# Patient Record
Sex: MALE | Race: WHITE | NOT HISPANIC OR LATINO | URBAN - METROPOLITAN AREA
[De-identification: names, ages, dates, MRNs, and addresses within clinical notes are randomized per-mention and may not be internally consistent; named-entity substitution may affect disease eponyms.]

---

## 2017-09-18 ENCOUNTER — INPATIENT (INPATIENT)
Facility: HOSPITAL | Age: 27
LOS: 2 days | Discharge: HOME | DRG: 869 | End: 2017-09-21
Attending: FAMILY MEDICINE | Admitting: HOSPITALIST
Payer: COMMERCIAL

## 2017-09-18 VITALS — WEIGHT: 210.1 LBS | HEIGHT: 71 IN

## 2017-09-18 DIAGNOSIS — R16.1 SPLENOMEGALY, NOT ELSEWHERE CLASSIFIED: ICD-10-CM

## 2017-09-18 DIAGNOSIS — R50.9 FEVER, UNSPECIFIED: ICD-10-CM

## 2017-09-18 LAB
AMPHET UR-MCNC: NEGATIVE — SIGNIFICANT CHANGE UP
ANISOCYTOSIS BLD QL: SLIGHT — SIGNIFICANT CHANGE UP
APPEARANCE UR: CLEAR — SIGNIFICANT CHANGE UP
BARBITURATES UR SCN-MCNC: NEGATIVE — SIGNIFICANT CHANGE UP
BENZODIAZ UR-MCNC: NEGATIVE — SIGNIFICANT CHANGE UP
BILIRUB UR-MCNC: NEGATIVE — SIGNIFICANT CHANGE UP
CK MB CFR SERPL CALC: 1.6 NG/ML — SIGNIFICANT CHANGE UP (ref 0–6.7)
CK SERPL-CCNC: 196 U/L — SIGNIFICANT CHANGE UP (ref 30–200)
COCAINE METAB.OTHER UR-MCNC: NEGATIVE — SIGNIFICANT CHANGE UP
COLOR SPEC: YELLOW — SIGNIFICANT CHANGE UP
D DIMER BLD IA.RAPID-MCNC: 292 NG/ML DDU — HIGH
DIFF PNL FLD: NEGATIVE — SIGNIFICANT CHANGE UP
EOSINOPHIL NFR BLD AUTO: 1 % — SIGNIFICANT CHANGE UP (ref 0–6)
EPI CELLS # UR: SIGNIFICANT CHANGE UP
GLUCOSE UR QL: NEGATIVE MG/DL — SIGNIFICANT CHANGE UP
HCT VFR BLD CALC: 41.6 % — LOW (ref 42–52)
HGB BLD-MCNC: 14.6 G/DL — SIGNIFICANT CHANGE UP (ref 14–18)
HIV 1 & 2 AB SERPL IA.RAPID: SIGNIFICANT CHANGE UP
KETONES UR-MCNC: NEGATIVE — SIGNIFICANT CHANGE UP
LACTATE BLDV-MCNC: 1 MMOL/L — SIGNIFICANT CHANGE UP (ref 0.5–2)
LACTATE SERPL-SCNC: 0.8 MMOL/L — SIGNIFICANT CHANGE UP (ref 0.5–2)
LEUKOCYTE ESTERASE UR-ACNC: NEGATIVE — SIGNIFICANT CHANGE UP
LYMPHOCYTES # BLD AUTO: 4 % — LOW (ref 20–55)
MCHC RBC-ENTMCNC: 29.7 PG — SIGNIFICANT CHANGE UP (ref 27–31)
MCHC RBC-ENTMCNC: 35.1 G/DL — SIGNIFICANT CHANGE UP (ref 32–36)
MCV RBC AUTO: 84.6 FL — SIGNIFICANT CHANGE UP (ref 80–94)
METHADONE UR-MCNC: NEGATIVE — SIGNIFICANT CHANGE UP
MICROCYTES BLD QL: SLIGHT — SIGNIFICANT CHANGE UP
MONOCYTES NFR BLD AUTO: 5 % — SIGNIFICANT CHANGE UP (ref 3–10)
NEUTROPHILS NFR BLD AUTO: 88 % — HIGH (ref 37–73)
NEUTS BAND # BLD: 2 % — SIGNIFICANT CHANGE UP (ref 0–8)
NITRITE UR-MCNC: NEGATIVE — SIGNIFICANT CHANGE UP
OPIATES UR-MCNC: NEGATIVE — SIGNIFICANT CHANGE UP
PCP SPEC-MCNC: SIGNIFICANT CHANGE UP
PCP UR-MCNC: NEGATIVE — SIGNIFICANT CHANGE UP
PH UR: 6.5 — SIGNIFICANT CHANGE UP (ref 5–8)
PLAT MORPH BLD: NORMAL — SIGNIFICANT CHANGE UP
PLATELET # BLD AUTO: 154 K/UL — SIGNIFICANT CHANGE UP (ref 150–400)
PROT UR-MCNC: NEGATIVE MG/DL — SIGNIFICANT CHANGE UP
RAPID RVP RESULT: SIGNIFICANT CHANGE UP
RBC # BLD: 4.92 M/UL — SIGNIFICANT CHANGE UP (ref 4.6–6.2)
RBC # FLD: 12.2 % — SIGNIFICANT CHANGE UP (ref 11–15.6)
RBC BLD AUTO: ABNORMAL
RBC CASTS # UR COMP ASSIST: SIGNIFICANT CHANGE UP /HPF (ref 0–4)
SP GR SPEC: 1 — LOW (ref 1.01–1.02)
THC UR QL: NEGATIVE — SIGNIFICANT CHANGE UP
TROPONIN T SERPL-MCNC: <0.01 NG/ML — SIGNIFICANT CHANGE UP (ref 0–0.06)
UROBILINOGEN FLD QL: NEGATIVE MG/DL — SIGNIFICANT CHANGE UP
WBC # BLD: 5.8 K/UL — SIGNIFICANT CHANGE UP (ref 4.8–10.8)
WBC # FLD AUTO: 5.8 K/UL — SIGNIFICANT CHANGE UP (ref 4.8–10.8)
WBC UR QL: SIGNIFICANT CHANGE UP

## 2017-09-18 PROCEDURE — 74176 CT ABD & PELVIS W/O CONTRAST: CPT | Mod: 26

## 2017-09-18 PROCEDURE — 99285 EMERGENCY DEPT VISIT HI MDM: CPT | Mod: 25

## 2017-09-18 PROCEDURE — 71020: CPT | Mod: 26

## 2017-09-18 PROCEDURE — 99223 1ST HOSP IP/OBS HIGH 75: CPT

## 2017-09-18 PROCEDURE — 71275 CT ANGIOGRAPHY CHEST: CPT | Mod: 26

## 2017-09-18 RX ORDER — SODIUM CHLORIDE 9 MG/ML
1000 INJECTION, SOLUTION INTRAVENOUS
Qty: 0 | Refills: 0 | Status: DISCONTINUED | OUTPATIENT
Start: 2017-09-18 | End: 2017-09-19

## 2017-09-18 RX ORDER — LANOLIN ALCOHOL/MO/W.PET/CERES
3 CREAM (GRAM) TOPICAL ONCE
Qty: 0 | Refills: 0 | Status: COMPLETED | OUTPATIENT
Start: 2017-09-18 | End: 2017-09-19

## 2017-09-18 RX ORDER — SODIUM CHLORIDE 9 MG/ML
3000 INJECTION INTRAMUSCULAR; INTRAVENOUS; SUBCUTANEOUS ONCE
Qty: 0 | Refills: 0 | Status: COMPLETED | OUTPATIENT
Start: 2017-09-18 | End: 2017-09-18

## 2017-09-18 RX ORDER — PIPERACILLIN AND TAZOBACTAM 4; .5 G/20ML; G/20ML
3.38 INJECTION, POWDER, LYOPHILIZED, FOR SOLUTION INTRAVENOUS ONCE
Qty: 0 | Refills: 0 | Status: COMPLETED | OUTPATIENT
Start: 2017-09-18 | End: 2017-09-18

## 2017-09-18 RX ORDER — PANTOPRAZOLE SODIUM 20 MG/1
40 TABLET, DELAYED RELEASE ORAL
Qty: 0 | Refills: 0 | Status: DISCONTINUED | OUTPATIENT
Start: 2017-09-18 | End: 2017-09-21

## 2017-09-18 RX ORDER — ACETAMINOPHEN 500 MG
650 TABLET ORAL EVERY 6 HOURS
Qty: 0 | Refills: 0 | Status: DISCONTINUED | OUTPATIENT
Start: 2017-09-18 | End: 2017-09-21

## 2017-09-18 RX ORDER — KETOROLAC TROMETHAMINE 30 MG/ML
60 SYRINGE (ML) INJECTION ONCE
Qty: 0 | Refills: 0 | Status: DISCONTINUED | OUTPATIENT
Start: 2017-09-18 | End: 2017-09-18

## 2017-09-18 RX ORDER — KETOROLAC TROMETHAMINE 30 MG/ML
30 SYRINGE (ML) INJECTION ONCE
Qty: 0 | Refills: 0 | Status: DISCONTINUED | OUTPATIENT
Start: 2017-09-18 | End: 2017-09-18

## 2017-09-18 RX ORDER — IBUPROFEN 200 MG
400 TABLET ORAL EVERY 6 HOURS
Qty: 0 | Refills: 0 | Status: DISCONTINUED | OUTPATIENT
Start: 2017-09-18 | End: 2017-09-21

## 2017-09-18 RX ORDER — VANCOMYCIN HCL 1 G
1000 VIAL (EA) INTRAVENOUS ONCE
Qty: 0 | Refills: 0 | Status: COMPLETED | OUTPATIENT
Start: 2017-09-18 | End: 2017-09-18

## 2017-09-18 RX ORDER — DIAZEPAM 5 MG
2 TABLET ORAL ONCE
Qty: 0 | Refills: 0 | Status: DISCONTINUED | OUTPATIENT
Start: 2017-09-18 | End: 2017-09-18

## 2017-09-18 RX ORDER — ONDANSETRON 8 MG/1
4 TABLET, FILM COATED ORAL EVERY 4 HOURS
Qty: 0 | Refills: 0 | Status: DISCONTINUED | OUTPATIENT
Start: 2017-09-18 | End: 2017-09-21

## 2017-09-18 RX ORDER — IBUPROFEN 200 MG
600 TABLET ORAL ONCE
Qty: 0 | Refills: 0 | Status: COMPLETED | OUTPATIENT
Start: 2017-09-18 | End: 2017-09-18

## 2017-09-18 RX ADMIN — Medication 30 MILLIGRAM(S): at 07:25

## 2017-09-18 RX ADMIN — SODIUM CHLORIDE 3000 MILLILITER(S): 9 INJECTION INTRAMUSCULAR; INTRAVENOUS; SUBCUTANEOUS at 12:33

## 2017-09-18 RX ADMIN — PIPERACILLIN AND TAZOBACTAM 200 GRAM(S): 4; .5 INJECTION, POWDER, LYOPHILIZED, FOR SOLUTION INTRAVENOUS at 12:33

## 2017-09-18 RX ADMIN — Medication 600 MILLIGRAM(S): at 10:25

## 2017-09-18 RX ADMIN — Medication 400 MILLIGRAM(S): at 22:48

## 2017-09-18 RX ADMIN — Medication 250 MILLIGRAM(S): at 13:44

## 2017-09-18 RX ADMIN — Medication 2 MILLIGRAM(S): at 05:47

## 2017-09-18 RX ADMIN — Medication 400 MILLIGRAM(S): at 21:48

## 2017-09-18 RX ADMIN — Medication 30 MILLIGRAM(S): at 05:47

## 2017-09-18 RX ADMIN — SODIUM CHLORIDE 125 MILLILITER(S): 9 INJECTION, SOLUTION INTRAVENOUS at 19:15

## 2017-09-18 RX ADMIN — Medication 650 MILLIGRAM(S): at 19:00

## 2017-09-18 RX ADMIN — Medication 600 MILLIGRAM(S): at 10:29

## 2017-09-18 NOTE — ED ADULT TRIAGE NOTE - CHIEF COMPLAINT QUOTE
pt reports body aches, sharp left upper abd pain with coughing, SOB, dizziness. symptoms began yesterday AM.

## 2017-09-18 NOTE — ED ADULT NURSE REASSESSMENT NOTE - NS ED NURSE REASSESS COMMENT FT1
MD Cao made aware of pts oral temp increasing. awaiting further orders.
MD Cao made aware pt has 103 oral temp, pt in bed c/o chills and being cold. Awaiting further orders.
pt in bed resting comfortably, c/o feeling cold, but states he feels better.
Report received from day RN Pt A&Ox4 c/o general lethargy and fever at this time. Pt resting comfortably, VSS, no signs of distress at this time, medicated for fever admitted to 6 tower report given awaiting transport, Safety maintained, call bell in reach.
pt AOX4 c/o pain during inspiration, pt states pain medication gave some relief, was able to sleep for a little bit but the pain is still there when he breaths in. IV patent and flushing without difficulty, no signs of infiltration.

## 2017-09-18 NOTE — ED PROVIDER NOTE - OBJECTIVE STATEMENT
PT with no SPMHx presents to the ED with multiple complaints of diffuse soreness, and ache, dizziness and pain behind Lt lower ribs, PT states that the pain started suddenly yesterday and he describes the pain behind his ribs as sharp non radiating that comes and goes, PT states he has been able to do all of his normal activity with some difficulty, PT states he has been under a lot of stress recently. PT admits to numbness tingling in his finger tips and toes, SOB, cough PT denies fever, chills, LOC, HA, diff breathing throat pain, N/V, Abd pain, urinary symptoms, weakness, PT with no SPMHx presents to the ED with multiple complaints of diffuse soreness, and ache, dizziness and pain behind Lt lower ribs, PT states that the pain started suddenly yesterday and he describes the pain behind his ribs as sharp non radiating that comes and goes, PT states he has been able to do all of his normal activity with some difficulty, PT states he has been under a lot of stress recently. PT admits to numbness tingling in his finger tips and toes, SOB, cough weakness.  PT denies fever, chills, LOC, HA, diff breathing throat pain, N/V, Abd pain, urinary symptoms, weakness,

## 2017-09-18 NOTE — H&P ADULT - NSHPLABSRESULTS_GEN_ALL_CORE
14.6   5.8   )-----------( 154      ( 18 Sep 2017 05:48 )             41.6   09-18    142  |  102  |  18.0  ----------------------------<  85  3.8   |  29.0  |  1.04    Ca    9.2      18 Sep 2017 05:49    TPro  7.0  /  Alb  4.5  /  TBili  0.6  /  DBili  x   /  AST  24  /  ALT  34  /  AlkPhos  90  09-18

## 2017-09-18 NOTE — ED ADULT NURSE REASSESSMENT NOTE - COMFORT CARE
repositioned/plan of care explained
plan of care explained
treatment delay explained/wait time explained/plan of care explained/repositioned/side rails up

## 2017-09-18 NOTE — H&P ADULT - PROBLEM SELECTOR PLAN 1
fever work up, blood cultures ,  r/o mononucleosis ,  parasitic infection, ova parasitis   HIV screening accepted by the patient   ID consult called discussed with Dr Lemons, repeat lactate in 6 hours

## 2017-09-18 NOTE — H&P ADULT - HISTORY OF PRESENT ILLNESS
26 yo male no significant PMHx presented to the ED in the morning with c/o dizziness, left sided abdominal pain below rib and bodyaches . He started to have generalized joint pain, body aches feeling very tired on saturday , continued all day yesterday with associated dizziness since last night on off and sore throat chills . He was sweaty at work last night, denies fever , + chills , denies headache, diarrhea, dyspnea , cough . In the ED spiked fever 103 this afternoon , pain in the left upper quadrant worse with breathing sharp under left rib cage . In the ED CTA of chest ordered PE ruled out, abdomen CT showed  splenomegaly .

## 2017-09-18 NOTE — H&P ADULT - FAMILY HISTORY
Uncle  Still living? Yes, Estimated age: Age Unknown  Family history of ischemic heart disease, Age at diagnosis: Age Unknown

## 2017-09-18 NOTE — ED ADULT NURSE NOTE - OBJECTIVE STATEMENT
Pt c/o LUQ abd pain that is sharp and intermittent which started yesterday with associated chills and nausea. Pt states when he takes a deep breath it hurts more and it causes him to have SOB and feel dizzy and light headed. Pt does admit he is under a lot of stress lately. Pt denies fever, bladder/bowel dysfunction, cough, chest pain, lower abd pain, vomiting, HA.

## 2017-09-19 DIAGNOSIS — A77.0 SPOTTED FEVER DUE TO RICKETTSIA RICKETTSII: ICD-10-CM

## 2017-09-19 DIAGNOSIS — D69.6 THROMBOCYTOPENIA, UNSPECIFIED: ICD-10-CM

## 2017-09-19 DIAGNOSIS — A28.9 ZOONOTIC BACTERIAL DISEASE, UNSPECIFIED: ICD-10-CM

## 2017-09-19 LAB
ALBUMIN SERPL ELPH-MCNC: 3.6 G/DL — SIGNIFICANT CHANGE UP (ref 3.3–5.2)
ALP SERPL-CCNC: 67 U/L — SIGNIFICANT CHANGE UP (ref 40–120)
ALT FLD-CCNC: 24 U/L — SIGNIFICANT CHANGE UP
AST SERPL-CCNC: 16 U/L — SIGNIFICANT CHANGE UP
B BURGDOR C6 AB SER-ACNC: NEGATIVE — SIGNIFICANT CHANGE UP
B BURGDOR IGG+IGM SER-ACNC: 0.09 INDEX — SIGNIFICANT CHANGE UP (ref 0.01–0.89)
BASOPHILS # BLD AUTO: 0 K/UL — SIGNIFICANT CHANGE UP (ref 0–0.2)
BASOPHILS NFR BLD AUTO: 0.2 % — SIGNIFICANT CHANGE UP (ref 0–2)
BILIRUB DIRECT SERPL-MCNC: 0.2 MG/DL — SIGNIFICANT CHANGE UP (ref 0–0.3)
BILIRUB INDIRECT FLD-MCNC: 0.4 MG/DL — SIGNIFICANT CHANGE UP (ref 0.2–1)
BILIRUB SERPL-MCNC: 0.6 MG/DL — SIGNIFICANT CHANGE UP (ref 0.4–2)
EOSINOPHIL # BLD AUTO: 0 K/UL — SIGNIFICANT CHANGE UP (ref 0–0.5)
EOSINOPHIL NFR BLD AUTO: 0.2 % — SIGNIFICANT CHANGE UP (ref 0–5)
HCT VFR BLD CALC: 40.5 % — LOW (ref 42–52)
HGB BLD-MCNC: 13.8 G/DL — LOW (ref 14–18)
LDH SERPL L TO P-CCNC: 162 U/L — SIGNIFICANT CHANGE UP (ref 98–192)
LYMPHOCYTES # BLD AUTO: 0.7 K/UL — LOW (ref 1–4.8)
LYMPHOCYTES # BLD AUTO: 12.9 % — LOW (ref 20–55)
MCHC RBC-ENTMCNC: 29.4 PG — SIGNIFICANT CHANGE UP (ref 27–31)
MCHC RBC-ENTMCNC: 34.1 G/DL — SIGNIFICANT CHANGE UP (ref 32–36)
MCV RBC AUTO: 86.4 FL — SIGNIFICANT CHANGE UP (ref 80–94)
MONOCYTES # BLD AUTO: 0.8 K/UL — SIGNIFICANT CHANGE UP (ref 0–0.8)
MONOCYTES NFR BLD AUTO: 13.8 % — HIGH (ref 3–10)
NEUTROPHILS # BLD AUTO: 4.1 K/UL — SIGNIFICANT CHANGE UP (ref 1.8–8)
NEUTROPHILS NFR BLD AUTO: 72.5 % — SIGNIFICANT CHANGE UP (ref 37–73)
PLATELET # BLD AUTO: 136 K/UL — LOW (ref 150–400)
PROT SERPL-MCNC: 6.4 G/DL — LOW (ref 6.6–8.7)
RBC # BLD: 4.69 M/UL — SIGNIFICANT CHANGE UP (ref 4.6–6.2)
RBC # FLD: 12.6 % — SIGNIFICANT CHANGE UP (ref 11–15.6)
WBC # BLD: 5.7 K/UL — SIGNIFICANT CHANGE UP (ref 4.8–10.8)
WBC # FLD AUTO: 5.7 K/UL — SIGNIFICANT CHANGE UP (ref 4.8–10.8)

## 2017-09-19 PROCEDURE — 99223 1ST HOSP IP/OBS HIGH 75: CPT

## 2017-09-19 RX ORDER — BENZOCAINE AND MENTHOL 5; 1 G/100ML; G/100ML
1 LIQUID ORAL EVERY 6 HOURS
Qty: 0 | Refills: 0 | Status: DISCONTINUED | OUTPATIENT
Start: 2017-09-19 | End: 2017-09-21

## 2017-09-19 RX ORDER — LANOLIN ALCOHOL/MO/W.PET/CERES
3 CREAM (GRAM) TOPICAL ONCE
Qty: 0 | Refills: 0 | Status: COMPLETED | OUTPATIENT
Start: 2017-09-19 | End: 2017-09-19

## 2017-09-19 RX ADMIN — Medication 650 MILLIGRAM(S): at 00:21

## 2017-09-19 RX ADMIN — Medication 3 MILLIGRAM(S): at 23:08

## 2017-09-19 RX ADMIN — Medication 110 MILLIGRAM(S): at 22:11

## 2017-09-19 RX ADMIN — SODIUM CHLORIDE 125 MILLILITER(S): 9 INJECTION, SOLUTION INTRAVENOUS at 08:36

## 2017-09-19 RX ADMIN — Medication 110 MILLIGRAM(S): at 10:56

## 2017-09-19 RX ADMIN — SODIUM CHLORIDE 125 MILLILITER(S): 9 INJECTION, SOLUTION INTRAVENOUS at 16:23

## 2017-09-19 RX ADMIN — BENZOCAINE AND MENTHOL 1 LOZENGE: 5; 1 LIQUID ORAL at 16:23

## 2017-09-19 RX ADMIN — Medication 400 MILLIGRAM(S): at 16:24

## 2017-09-19 RX ADMIN — Medication 3 MILLIGRAM(S): at 00:21

## 2017-09-19 RX ADMIN — PANTOPRAZOLE SODIUM 40 MILLIGRAM(S): 20 TABLET, DELAYED RELEASE ORAL at 07:13

## 2017-09-19 RX ADMIN — Medication 400 MILLIGRAM(S): at 16:23

## 2017-09-19 RX ADMIN — Medication 20 MILLIGRAM(S): at 16:23

## 2017-09-19 NOTE — CONSULT NOTE ADULT - ASSESSMENT
IMP  PROB RMSF WITH RASH SPLENOMEGALY AND LOW PLATS    R/O BABESIA BUT LESS LIKELY  R/O ANAPLASMOSIS  MAY BE CO INFECTED LYME    PLAN  IV VIBRA  PERIPH SMEAR FOR BABESIA    D/W PT EXTENSIVELY

## 2017-09-19 NOTE — CONSULT NOTE ADULT - SUBJECTIVE AND OBJECTIVE BOX
[facility]IC[facility] [place] GUILLERMO MONCADA MD FACP   ENRIQUE SEGUNDO MD  Diplomates American Board of Internal Medicine and Infecctious Diseases      MRN-[id number]  KELLY GAMINO is a 27y  Male     CC:  OREV HEALTY WM 72 HRS FEVER LUQ PAIN,  H/O TIC ON BODY 4 WEEKS AGO  TRAVELS [place]LAND  SXS  FEVER DIFFUSE MYALGIAS, HA  PAIN LUQ  ADMITTED WITH FEVER, MILD THROMBOCYTOPENIA  CT LARGE SPLEEN    Past Medical & Surgical Hx:  PAST MEDICAL & SURGICAL HISTORY:  No pertinent past medical history  No significant past surgical history      Problem List:  HEALTH ISSUES - PROBLEM Dx:  Splenomegaly: Splenomegaly  Acute febrile illness: Acute febrile illness            Allergies    No Known Allergies    Intolerances          ANTIBIOTICS:   doxycycline IVPB      doxycycline IVPB 100 milliGRAM(s) IV Intermittent once  doxycycline IVPB 100 milliGRAM(s) IV Intermittent every 12 hours       Review of Systems: - Negative except as mentioned below  AS ABOVE    Physical Exam:    Vital Signs Last 24 Hrs  T(C): 36.8 (19 Sep 2017 08:52), Max: 39.4 (18 Sep 2017 11:35)  T(F): 98.2 (19 Sep 2017 08:52), Max: 103 (18 Sep 2017 11:35)  HR: 85 (19 Sep 2017 08:52) (82 - 102)  BP: 168/64 (19 Sep 2017 08:52) (102/58 - 168/64)  BP(mean): --  RR: 18 (19 Sep 2017 08:52) (16 - 18)  SpO2: 100% (19 Sep 2017 08:52) (97% - 100%)        GEN: NAD, pleasant NON TOXIC  HEENT: normocephalic and atraumatic. EOMI. KRISTINA. Moist mucosa. Clear Posterior pharynx.  NECK: Supple. No carotid bruits.  No lymphadenopathy or thyromegaly.  LUNGS: Clear to auscultation.  HEART: Regular rate and rhythm without murmur.  ABDOMEN: Soft, nontender, and nondistended.  Positive bowel sounds.  TENDER LUQ  EXTREMITIES: Without any cyanosis, clubbing, rash, lesions or edema.  NEUROLOGIC: Cranial nerves II through XII are grossly intact.  MUSCULOSKELETAL:  SKIN: No ulceration or induration present. DIFFUSE PETICH RASH ON BACK - NOT PALMS AND SOLES      Labs:                        13.8   5.7   )-----------( 136      ( 19 Sep 2017 07:52 )             40.5         142  |  102  |  18.0  ----------------------------<  85  3.8   |  29.0  |  1.04    Ca    9.2      18 Sep 2017 05:49    TPro  6.4<L>  /  Alb  3.6  /  TBili  0.6  /  DBili  0.2  /  AST  16  /  ALT  24  /  AlkPhos  67        Urinalysis Basic - ( 18 Sep 2017 16:05 )    Color: Yellow / Appearance: Clear / S.005 / pH: x  Gluc: x / Ketone: Negative  / Bili: Negative / Urobili: Negative mg/dL   Blood: x / Protein: Negative mg/dL / Nitrite: Negative   Leuk Esterase: Negative / RBC: 0-2 /HPF / WBC 0-2   Sq Epi: x / Non Sq Epi: x / Bacteria: x        Hematocrit: 40.5 % ( @ 07:52)  Hemoglobin: 13.8 g/dL ( @ 07:52)  Platelet Count - Automated: 136 K/uL ( @ 07:52)  WBC Count: 5.7 K/uL ( @ 07:52)  Hematocrit: 41.6 % ( @ 05:48)  Hemoglobin: 14.6 g/dL ( @ 05:48)  Platelet Count - Automated: 154 K/uL ( @ 05:48)  WBC Count: 5.8 K/uL ( @ 05:48)    Blood Urea Nitrogen, Serum: 18.0 mg/dL ( @ 05:49)  Potassium, Serum: 3.8 mmol/L ( @ 05:49)  Sodium, Serum: 142 mmol/L ( @ 05:49)          MICROBIOLOGY        RADIOLOGY  < from: CT Abdomen and Pelvis No Cont (17 @ 13:23) >  Comparisons: CTA chest dated 2017    Findings: No pleural fluid in the chest or ascites in the abdomen. The   visualized lower lungs are clear.   Liver is normal in size,  shape and   density. Spleen measures 16 cm in length. There are no focal masses or   cysts.  The gallbladder is normal..  There is no evidence of intrahepatic   biliary dilatation. The pancreas and adrenal glands are normal. There is   no evidence of retroperitoneal lymphadenopathy. The kidneys are normal in   size,  shape and density. There is no evidence of obstructive uropathy or   renal mass. No evidence of hydronephrosis. Residual contrast is present   kidneys ureter and bladder from CTA study The aorta is normal in caliber   and contour. No evidence of an aneurysm.     Stool is present in the colon.  The terminal ileum and appendix appear   unremarkable..  There are no extracolonic fluid collections or   inflammatory changes.  Bone window examination demonstrates mild   degenerative changes consistent with age..  No evidence of osteolytic or   osteoblastic bone disease.     The prostate gland is normal in size, shape and density.. The inguinal   areas are normal..  No evidence of pelvic lymphadenopathy.   Urinary   bladder is normal in wall thickness, contour and density. Pelvic   musculature appears symmetrical.    Impression: Splenomegaly.              < end of copied text >              MANOJ MONCADA MD FACP

## 2017-09-19 NOTE — PROGRESS NOTE ADULT - SUBJECTIVE AND OBJECTIVE BOX
SUBJECTIVE    REVIEW OF SYSTEMS    General: Not in any distress    Skin/Breast: No rash  	  ENMT: No visual problems, + sore throat	    Respiratory and Thorax: No cough, No CP, No SOB  	  Cardiovascular: No CP, No palpitations    Gastrointestinal: No Abd pain, No N/V/D    Musculoskeletal: No Joint pain, No back pain	    Neurological: No headache    Psychiatric: No anxiety      OBJECTIVE    Vital Signs Last 24 Hrs  T(C): 37.9 (09-19-17 @ 18:01), Max: 38.8 (09-19-17 @ 16:20)  T(F): 100.3 (09-19-17 @ 18:01), Max: 101.9 (09-19-17 @ 16:20)  HR: 92 (09-19-17 @ 18:01) (85 - 98)  BP: 126/70 (09-19-17 @ 18:01) (111/79 - 168/64)  BP(mean): --  RR: 18 (09-19-17 @ 18:01) (18 - 18)  SpO2: 98% (09-19-17 @ 18:01) (98% - 100%)    PHYSICAL EXAM:    Constitutional: Not in any distress    Eyes: No conjunctival injection    ENMT: + mild throat erythema    Neck: + shoddy, painful adenopathy    Back: Straight, no defects    Respiratory: clear b/l    Cardiovascular: RRR, no murmur    Gastrointestinal: soft, NT, ND    Extremities: No edema, no erythema    Neurological: no focal deficit    Skin: No rash      MEDICATIONS  (STANDING):  pantoprazole    Tablet 40 milliGRAM(s) Oral before breakfast  doxycycline IVPB      doxycycline IVPB 100 milliGRAM(s) IV Intermittent every 12 hours    MEDICATIONS  (PRN):  acetaminophen   Tablet 650 milliGRAM(s) Oral every 6 hours PRN For Temp greater than 38.5 C (101.3 F)  acetaminophen   Tablet. 650 milliGRAM(s) Oral every 6 hours PRN Mild Pain (1 - 3)  ibuprofen  Tablet 400 milliGRAM(s) Oral every 6 hours PRN fever over >101 alernating with tylenol  ondansetron Injectable 4 milliGRAM(s) IV Push every 4 hours PRN Nausea and/or Vomiting  benzocaine 15 mG/menthol 3.6 mG Lozenge 1 Lozenge Oral every 6 hours PRN Sore Throat    CARDIAC MARKERS ( 18 Sep 2017 05:49 )  x     / <0.01 ng/mL / 196 U/L / x     / 1.6 ng/mL                            13.8   5.7   )-----------( 136      ( 19 Sep 2017 07:52 )             40.5     18 Sep 2017 05:49    142    |  102    |  18.0   ----------------------------<  85     3.8     |  29.0   |  1.04     Ca    9.2        18 Sep 2017 05:49    TPro  6.4    /  Alb  3.6    /  TBili  0.6    /  DBili  0.2    /  AST  16     /  ALT  24     /  AlkPhos  67     19 Sep 2017 07:52    Allergies    No Known Allergies    Intolerances

## 2017-09-20 ENCOUNTER — TRANSCRIPTION ENCOUNTER (OUTPATIENT)
Age: 27
End: 2017-09-20

## 2017-09-20 LAB
B BURGDOR C6 AB SER-ACNC: NEGATIVE — SIGNIFICANT CHANGE UP
B BURGDOR IGG+IGM SER-ACNC: 0.08 INDEX — SIGNIFICANT CHANGE UP (ref 0.01–0.89)
BASOPHILS # BLD AUTO: 0 K/UL — SIGNIFICANT CHANGE UP (ref 0–0.2)
BASOPHILS NFR BLD AUTO: 0.2 % — SIGNIFICANT CHANGE UP (ref 0–2)
CULTURE RESULTS: SIGNIFICANT CHANGE UP
EOSINOPHIL # BLD AUTO: 0 K/UL — SIGNIFICANT CHANGE UP (ref 0–0.5)
EOSINOPHIL NFR BLD AUTO: 0.2 % — SIGNIFICANT CHANGE UP (ref 0–5)
HAV IGM SER-ACNC: SIGNIFICANT CHANGE UP
HBV CORE IGM SER-ACNC: SIGNIFICANT CHANGE UP
HBV SURFACE AG SER-ACNC: SIGNIFICANT CHANGE UP
HCT VFR BLD CALC: 38.5 % — LOW (ref 42–52)
HCV AB S/CO SERPL IA: 0.11 S/CO — SIGNIFICANT CHANGE UP
HCV AB SERPL-IMP: SIGNIFICANT CHANGE UP
HETEROPH AB TITR SER AGGL: NEGATIVE — SIGNIFICANT CHANGE UP
HGB BLD-MCNC: 13.4 G/DL — LOW (ref 14–18)
LYMPHOCYTES # BLD AUTO: 0.8 K/UL — LOW (ref 1–4.8)
LYMPHOCYTES # BLD AUTO: 18.7 % — LOW (ref 20–55)
MCHC RBC-ENTMCNC: 30 PG — SIGNIFICANT CHANGE UP (ref 27–31)
MCHC RBC-ENTMCNC: 34.8 G/DL — SIGNIFICANT CHANGE UP (ref 32–36)
MCV RBC AUTO: 86.1 FL — SIGNIFICANT CHANGE UP (ref 80–94)
MONOCYTES # BLD AUTO: 0.5 K/UL — SIGNIFICANT CHANGE UP (ref 0–0.8)
MONOCYTES NFR BLD AUTO: 11.5 % — HIGH (ref 3–10)
NEUTROPHILS # BLD AUTO: 3.1 K/UL — SIGNIFICANT CHANGE UP (ref 1.8–8)
NEUTROPHILS NFR BLD AUTO: 69.2 % — SIGNIFICANT CHANGE UP (ref 37–73)
PLATELET # BLD AUTO: 161 K/UL — SIGNIFICANT CHANGE UP (ref 150–400)
RBC # BLD: 4.47 M/UL — LOW (ref 4.6–6.2)
RBC # FLD: 12.6 % — SIGNIFICANT CHANGE UP (ref 11–15.6)
SPECIMEN SOURCE: SIGNIFICANT CHANGE UP
WBC # BLD: 4.4 K/UL — LOW (ref 4.8–10.8)
WBC # FLD AUTO: 4.4 K/UL — LOW (ref 4.8–10.8)

## 2017-09-20 PROCEDURE — 99233 SBSQ HOSP IP/OBS HIGH 50: CPT

## 2017-09-20 RX ORDER — LANOLIN ALCOHOL/MO/W.PET/CERES
3 CREAM (GRAM) TOPICAL ONCE
Qty: 0 | Refills: 0 | Status: COMPLETED | OUTPATIENT
Start: 2017-09-20 | End: 2017-09-20

## 2017-09-20 RX ADMIN — Medication 3 MILLIGRAM(S): at 23:41

## 2017-09-20 RX ADMIN — PANTOPRAZOLE SODIUM 40 MILLIGRAM(S): 20 TABLET, DELAYED RELEASE ORAL at 05:18

## 2017-09-20 RX ADMIN — Medication 110 MILLIGRAM(S): at 05:21

## 2017-09-20 RX ADMIN — Medication 100 MILLIGRAM(S): at 15:18

## 2017-09-20 NOTE — DISCHARGE NOTE ADULT - HOSPITAL COURSE
24 yo male presented to the er with fever x 2 days  found to have splenomegaly on ct scan, and low platelets, admitted to r/o sepsis  diagnosed with errol mountain spotted fever, started on iv vibramycin  pt now afebrile, stable for d/c home

## 2017-09-20 NOTE — PROGRESS NOTE ADULT - SUBJECTIVE AND OBJECTIVE BOX
[facility] DISEASES [facility]NAL [facility]IC[facility] OF [place] GUILLERMO MONCADA MD FACP   ENRIQUE SEGUNDO MD  Diplomates American Board of Internal Medicine and Infectious Diseases      KELLY GAMINO  [id number]  27y    INTERVAL HPI/OVERNIGHT EVENTS:  TEMP DOEN  AWAITING AM LABS  DIFF IV VIBRA  LESS SPLENIC PAIN    Vital Signs Last 24 Hrs  T(C): 36.7 (20 Sep 2017 08:06), Max: 38.8 (19 Sep 2017 16:20)  T(F): 98 (20 Sep 2017 08:06), Max: 101.9 (19 Sep 2017 16:20)  HR: 60 (20 Sep 2017 08:06) (60 - 92)  BP: 111/70 (20 Sep 2017 08:06) (110/60 - 168/64)  BP(mean): --  RR: 18 (20 Sep 2017 08:06) (18 - 18)  SpO2: 98% (19 Sep 2017 23:10) (98% - 100%)    ANTIBIOTICS  doxycycline hyclate Capsule 100 milliGRAM(s) Oral every 12 hours      Allergies    No Known Allergies    Intolerances        REVIEW OF SYSTEMS:    PHYSICAL EXAM:  Vital Signs Last 24 Hrs  T(C): 36.7 (20 Sep 2017 08:06), Max: 38.8 (19 Sep 2017 16:20)  T(F): 98 (20 Sep 2017 08:06), Max: 101.9 (19 Sep 2017 16:20)  HR: 60 (20 Sep 2017 08:06) (60 - 92)  BP: 111/70 (20 Sep 2017 08:06) (110/60 - 168/64)  BP(mean): --  RR: 18 (20 Sep 2017 08:06) (18 - 18)  SpO2: 98% (19 Sep 2017 23:10) (98% - 100%)      GEN: NAD, pleasant  HEENT: normocephalic and atraumatic. EOMI. KRISTINA. Moist mucosa. Clear Posterior pharynx.  NECK: Supple. No carotid bruits.  No lymphadenopathy or thyromegaly.  LUNGS: Clear to auscultation.  HEART: Regular rate and rhythm without murmur.  ABDOMEN: Soft, nontender, and nondistended.  Positive bowel sounds.  No hepatosplenomegaly was noted.LESS PAIN LUQ  EXTREMITIES: Without any cyanosis, clubbing, rash, lesions or edema.  NEUROLOGIC: Cranial nerves II through XII are grossly intact.  MUSCULOSKELETAL:  SKIN: No ulceration or induration present. RASH NO CHANGE BACK - NO WHERE ELSE    LABS:                        13.8   5.7   )-----------( 136      ( 19 Sep 2017 07:52 )             40.5           TPro  6.4<L>  /  Alb  3.6  /  TBili  0.6  /  DBili  0.2  /  AST  16  /  ALT  24  /  AlkPhos  67  09-19 09-19 @ 07:52  hct 40.5 % hgb 13.8 g/dL    09-19 @ 07:52  plat 136 K/uL wbc 5.7 K/uL    09-18 @ 05:48  hct 41.6 % hgb 14.6 g/dL    09-18 @ 05:48  plat 154 K/uL wbc 5.8 K/uL      09-18-17  creat 1.04 mg/dL gfr 113 mL/min/1.73M2 bun 18.0 mg/dL k 3.8 mmol/L      MICROBIOLOGY:  BABESIA NEG      RADIOLOGY & ADDITIONAL STUDIES:          MANOJ MONCADA MD FACP

## 2017-09-20 NOTE — PROGRESS NOTE ADULT - ASSESSMENT
RMSF  CHANGE PO VIBRA AS INTOL IV  DEFER D/C TILL AFEBRILE AND PLATELET RISING  POSS D.C IN AM  D/W PT

## 2017-09-20 NOTE — DISCHARGE NOTE ADULT - MEDICATION SUMMARY - MEDICATIONS TO TAKE
I will START or STAY ON the medications listed below when I get home from the hospital:    doxycycline monohydrate 100 mg oral capsule  -- 1 cap(s) by mouth every 12 hours  -- Indication: For Sanjiv Mountain spotted fever

## 2017-09-20 NOTE — DISCHARGE NOTE ADULT - PATIENT PORTAL LINK FT
“You can access the [employer] Patient Portal, offered by [facility], by registering with the following website: [email or web address]”

## 2017-09-20 NOTE — PROGRESS NOTE ADULT - SUBJECTIVE AND OBJECTIVE BOX
SUBJECTIVE    REVIEW OF SYSTEMS    General: Not in any pain	    Skin/Breast: No rash  	  ENMT: No visual problems, no sore throat	    Respiratory and Thorax: No cough, No CP, No SOB  	  Cardiovascular: No CP, No palpitations    Gastrointestinal: No Abd pain, No N/V/D    Musculoskeletal: No Joint pain, No back pain	    Neurological: No headache    Psychiatric: No anxiety      OBJECTIVE    Vital Signs Last 24 Hrs  T(C): 37.1 (09-20-17 @ 15:56), Max: 37.1 (09-19-17 @ 23:10)  T(F): 98.8 (09-20-17 @ 15:56), Max: 98.8 (09-20-17 @ 15:56)  HR: 78 (09-20-17 @ 15:56) (60 - 78)  BP: 132/81 (09-20-17 @ 15:56) (110/60 - 132/81)  BP(mean): --  RR: 18 (09-20-17 @ 15:56) (18 - 18)  SpO2: 98% (09-19-17 @ 23:10) (98% - 98%)    PHYSICAL EXAM:    Constitutional: Not in any distress    Eyes: No conjunctival injection    ENMT: No oral lesions    Neck: No nodes, no adenopathy    Back: Straight, no defects    Respiratory: clear b/l    Cardiovascular: RRR, no murmur    Gastrointestinal: soft, NT, ND    Extremities: No edema, no erythema    Neurological: no focal deficit    Skin: No rash      MEDICATIONS  (STANDING):  pantoprazole    Tablet 40 milliGRAM(s) Oral before breakfast  doxycycline hyclate Capsule 100 milliGRAM(s) Oral every 12 hours    MEDICATIONS  (PRN):  acetaminophen   Tablet 650 milliGRAM(s) Oral every 6 hours PRN For Temp greater than 38.5 C (101.3 F)  acetaminophen   Tablet. 650 milliGRAM(s) Oral every 6 hours PRN Mild Pain (1 - 3)  ibuprofen  Tablet 400 milliGRAM(s) Oral every 6 hours PRN fever over >101 alernating with tylenol  ondansetron Injectable 4 milliGRAM(s) IV Push every 4 hours PRN Nausea and/or Vomiting  benzocaine 15 mG/menthol 3.6 mG Lozenge 1 Lozenge Oral every 6 hours PRN Sore Throat                              13.4   4.4   )-----------( 161      ( 20 Sep 2017 09:23 )             38.5         TPro  6.4    /  Alb  3.6    /  TBili  0.6    /  DBili  0.2    /  AST  16     /  ALT  24     /  AlkPhos  67     19 Sep 2017 07:52    Allergies    No Known Allergies    Intolerances

## 2017-09-21 VITALS
RESPIRATION RATE: 18 BRPM | HEART RATE: 79 BPM | TEMPERATURE: 98 F | DIASTOLIC BLOOD PRESSURE: 76 MMHG | OXYGEN SATURATION: 99 % | SYSTOLIC BLOOD PRESSURE: 112 MMHG

## 2017-09-21 LAB
ANION GAP SERPL CALC-SCNC: 5 MMOL/L — SIGNIFICANT CHANGE UP (ref 5–17)
BASOPHILS # BLD AUTO: 0 K/UL — SIGNIFICANT CHANGE UP (ref 0–0.2)
BASOPHILS NFR BLD AUTO: 0.2 % — SIGNIFICANT CHANGE UP (ref 0–2)
BUN SERPL-MCNC: 14 MG/DL — SIGNIFICANT CHANGE UP (ref 8–20)
CALCIUM SERPL-MCNC: 9.2 MG/DL — SIGNIFICANT CHANGE UP (ref 8.6–10.2)
CHLORIDE SERPL-SCNC: 103 MMOL/L — SIGNIFICANT CHANGE UP (ref 98–107)
CO2 SERPL-SCNC: 29 MMOL/L — SIGNIFICANT CHANGE UP (ref 22–29)
CREAT SERPL-MCNC: 0.87 MG/DL — SIGNIFICANT CHANGE UP (ref 0.5–1.3)
EOSINOPHIL # BLD AUTO: 0 K/UL — SIGNIFICANT CHANGE UP (ref 0–0.5)
EOSINOPHIL NFR BLD AUTO: 1 % — SIGNIFICANT CHANGE UP (ref 0–5)
GLUCOSE SERPL-MCNC: 98 MG/DL — SIGNIFICANT CHANGE UP (ref 70–115)
HCT VFR BLD CALC: 41.6 % — LOW (ref 42–52)
HGB BLD-MCNC: 14.2 G/DL — SIGNIFICANT CHANGE UP (ref 14–18)
LYMPHOCYTES # BLD AUTO: 1.2 K/UL — SIGNIFICANT CHANGE UP (ref 1–4.8)
LYMPHOCYTES # BLD AUTO: 24.3 % — SIGNIFICANT CHANGE UP (ref 20–55)
MCHC RBC-ENTMCNC: 29.4 PG — SIGNIFICANT CHANGE UP (ref 27–31)
MCHC RBC-ENTMCNC: 34.1 G/DL — SIGNIFICANT CHANGE UP (ref 32–36)
MCV RBC AUTO: 86.1 FL — SIGNIFICANT CHANGE UP (ref 80–94)
MONOCYTES # BLD AUTO: 0.4 K/UL — SIGNIFICANT CHANGE UP (ref 0–0.8)
MONOCYTES NFR BLD AUTO: 8.8 % — SIGNIFICANT CHANGE UP (ref 3–10)
NEUTROPHILS # BLD AUTO: 3.2 K/UL — SIGNIFICANT CHANGE UP (ref 1.8–8)
NEUTROPHILS NFR BLD AUTO: 65.5 % — SIGNIFICANT CHANGE UP (ref 37–73)
PLATELET # BLD AUTO: 190 K/UL — SIGNIFICANT CHANGE UP (ref 150–400)
POTASSIUM SERPL-MCNC: 4.4 MMOL/L — SIGNIFICANT CHANGE UP (ref 3.5–5.3)
POTASSIUM SERPL-SCNC: 4.4 MMOL/L — SIGNIFICANT CHANGE UP (ref 3.5–5.3)
RBC # BLD: 4.83 M/UL — SIGNIFICANT CHANGE UP (ref 4.6–6.2)
RBC # FLD: 12.5 % — SIGNIFICANT CHANGE UP (ref 11–15.6)
SODIUM SERPL-SCNC: 137 MMOL/L — SIGNIFICANT CHANGE UP (ref 135–145)
WBC # BLD: 4.9 K/UL — SIGNIFICANT CHANGE UP (ref 4.8–10.8)
WBC # FLD AUTO: 4.9 K/UL — SIGNIFICANT CHANGE UP (ref 4.8–10.8)

## 2017-09-21 RX ADMIN — Medication 100 MILLIGRAM(S): at 06:15

## 2017-09-21 RX ADMIN — PANTOPRAZOLE SODIUM 40 MILLIGRAM(S): 20 TABLET, DELAYED RELEASE ORAL at 06:15

## 2017-09-21 NOTE — PROGRESS NOTE ADULT - SUBJECTIVE AND OBJECTIVE BOX
SUBJECTIVE    REVIEW OF SYSTEMS    General: Not in any pain	    Skin/Breast: No rash  	  ENMT: No visual problems, no sore throat	    Respiratory and Thorax: No cough, No CP, No SOB  	  Cardiovascular: No CP, No palpitations    Gastrointestinal: No Abd pain, No N/V/D    Musculoskeletal: No Joint pain, No back pain	    Neurological: No headache    Psychiatric: No anxiety      OBJECTIVE    Vital Signs Last 24 Hrs  T(C): 36.9 (09-21-17 @ 11:17), Max: 37.2 (09-21-17 @ 07:10)  T(F): 98.5 (09-21-17 @ 11:17), Max: 98.9 (09-21-17 @ 07:10)  HR: 79 (09-21-17 @ 11:17) (65 - 85)  BP: 112/76 (09-21-17 @ 11:17) (107/71 - 118/78)  BP(mean): --  RR: 18 (09-21-17 @ 11:17) (18 - 19)  SpO2: 99% (09-21-17 @ 11:17) (98% - 99%)    PHYSICAL EXAM:    Constitutional: Not in any distress    Eyes: No conjunctival injection    ENMT: No oral lesions    Neck: No nodes, no adenopathy    Back: Straight, no defects    Respiratory: clear b/l    Cardiovascular: RRR, no murmur    Gastrointestinal: soft, NT, ND    Extremities: No edema, no erythema    Neurological: no focal deficit    Skin: No rash      MEDICATIONS  (STANDING):  pantoprazole    Tablet 40 milliGRAM(s) Oral before breakfast  doxycycline hyclate Capsule 100 milliGRAM(s) Oral every 12 hours    MEDICATIONS  (PRN):  acetaminophen   Tablet 650 milliGRAM(s) Oral every 6 hours PRN For Temp greater than 38.5 C (101.3 F)  acetaminophen   Tablet. 650 milliGRAM(s) Oral every 6 hours PRN Mild Pain (1 - 3)  ibuprofen  Tablet 400 milliGRAM(s) Oral every 6 hours PRN fever over >101 alernating with tylenol  ondansetron Injectable 4 milliGRAM(s) IV Push every 4 hours PRN Nausea and/or Vomiting  benzocaine 15 mG/menthol 3.6 mG Lozenge 1 Lozenge Oral every 6 hours PRN Sore Throat                              14.2   4.9   )-----------( 190      ( 21 Sep 2017 08:00 )             41.6     21 Sep 2017 08:00    137    |  103    |  14.0   ----------------------------<  98     4.4     |  29.0   |  0.87     Ca    9.2        21 Sep 2017 08:00      Allergies    No Known Allergies    Intolerances

## 2017-09-21 NOTE — PROGRESS NOTE ADULT - ASSESSMENT
IMP : RMSF ON TX    PLAT RISING TO NL    PLAN:    OK FOR DISCHARGE TODAY ON PO VIBRAMYCIN 100 BID FOR TOTAL 7 DAYS  WILL NO LONGER SEE    PT TO F/UP WITH ID FOR EVAL AND SEROLOGIES 2 WEEKS

## 2017-09-21 NOTE — PROGRESS NOTE ADULT - SUBJECTIVE AND OBJECTIVE BOX
[facility] INFECTIOUS DISEASES AND INTERNAL MEDICINE OF [place] GUILLERMO MONCADA MD FACP   ENRIQUE SEGUNDO MD  Diplomates American Board of Internal Medicine and Infectious Diseases      KELLY GAMINO  MRN-[id number]  27y    INTERVAL HPI/OVERNIGHT EVENTS:  AFEBRILE  VSS  FEELS WELL  NO LUQ PAIN -MIN    Vital Signs Last 24 Hrs  T(C): 37.1 (21 Sep 2017 00:19), Max: 37.1 (20 Sep 2017 15:56)  T(F): 98.7 (21 Sep 2017 00:19), Max: 98.8 (20 Sep 2017 15:56)  HR: 85 (21 Sep 2017 00:19) (78 - 85)  BP: 118/78 (21 Sep 2017 00:19) (118/78 - 132/81)  BP(mean): --  RR: 18 (21 Sep 2017 00:19) (18 - 18)  SpO2: 98% (21 Sep 2017 00:19) (98% - 98%)    ANTIBIOTICS  doxycycline hyclate Capsule 100 milliGRAM(s) Oral every 12 hours      Allergies    No Known Allergies    Intolerances        REVIEW OF SYSTEMS:MIN LUQ PAIN    PHYSICAL EXAM:  Vital Signs Last 24 Hrs  T(C): 37.1 (21 Sep 2017 00:19), Max: 37.1 (20 Sep 2017 15:56)  T(F): 98.7 (21 Sep 2017 00:19), Max: 98.8 (20 Sep 2017 15:56)  HR: 85 (21 Sep 2017 00:19) (78 - 85)  BP: 118/78 (21 Sep 2017 00:19) (118/78 - 132/81)  BP(mean): --  RR: 18 (21 Sep 2017 00:19) (18 - 18)  SpO2: 98% (21 Sep 2017 00:19) (98% - 98%)      GEN: NAD, pleasant  HEENT: normocephalic and atraumatic. EOMI. KRISTINA. Moist mucosa. Clear Posterior pharynx.  NECK: Supple. No carotid bruits.  No lymphadenopathy or thyromegaly.  LUNGS: Clear to auscultation.  HEART: Regular rate and rhythm without murmur.  ABDOMEN: Soft, nontender, and nondistended.  Positive bowel sounds.  No hepatosplenomegaly was noted.  EXTREMITIES: Without any cyanosis, clubbing, rash, lesions or edema.  NEUROLOGIC: Cranial nerves II through XII are grossly intact.  MUSCULOSKELETAL:  SKIN: No ulceration or induration present. RASH SL FADING BACK    LABS:                        14.2   4.9   )-----------( 190      ( 21 Sep 2017 08:00 )             41.6       09-21    137  |  103  |  14.0  ----------------------------<  98  4.4   |  29.0  |  0.87    Ca    9.2      21 Sep 2017 08:00          09-21 @ 08:00  hct 41.6 % hgb 14.2 g/dL    09-21 @ 08:00  plat 190 K/uL wbc 4.9 K/uL    09-20 @ 09:23  hct 38.5 % hgb 13.4 g/dL    09-20 @ 09:23  plat 161 K/uL wbc 4.4 K/uL    09-19 @ 07:52  hct 40.5 % hgb 13.8 g/dL    09-19 @ 07:52  plat 136 K/uL wbc 5.7 K/uL      09-21-17  creat 0.87 mg/dL gfr 136 mL/min/1.73M2 bun 14.0 mg/dL k 4.4 mmol/L      MICROBIOLOGY:  LYME NEG  BABESIA NEG  RMSF P      RADIOLOGY & ADDITIONAL STUDIES:          MANOJ MONCADA MD FACP

## 2017-09-22 LAB
B MICROTI DNA BLD QL NAA+PROBE: NEGATIVE — SIGNIFICANT CHANGE UP
BABESIA DNA SPEC QL NAA+PROBE: NEGATIVE — SIGNIFICANT CHANGE UP
BABESIA DNA SPEC QL NAA+PROBE: NEGATIVE — SIGNIFICANT CHANGE UP
EBV EA AB SER IA-ACNC: <5 U/ML — SIGNIFICANT CHANGE UP
EBV EA AB TITR SER IF: POSITIVE
EBV EA IGG SER-ACNC: NEGATIVE — SIGNIFICANT CHANGE UP
EBV NA IGG SER IA-ACNC: 22.2 U/ML — HIGH
EBV PATRN SPEC IB-IMP: SIGNIFICANT CHANGE UP
EBV VCA IGG AVIDITY SER QL IA: POSITIVE
EBV VCA IGM SER IA-ACNC: 19.7 U/ML — SIGNIFICANT CHANGE UP
EBV VCA IGM SER IA-ACNC: 408 U/ML — HIGH
EBV VCA IGM TITR FLD: NEGATIVE — SIGNIFICANT CHANGE UP
HETEROPH AB TITR SER AGGL: NEGATIVE — SIGNIFICANT CHANGE UP
R RICKETTSI AB SER-ACNC: NEGATIVE — SIGNIFICANT CHANGE UP
R RICKETTSI IGM SER-ACNC: 0.28 INDEX — SIGNIFICANT CHANGE UP (ref 0–0.89)
RICK SF IGG TITR SER IF: NEGATIVE — SIGNIFICANT CHANGE UP
RICK SF IGM TITR SER IF: 0.28 INDEX — SIGNIFICANT CHANGE UP (ref 0–0.89)

## 2017-09-23 LAB
B MICROTI IGG TITR SER: SIGNIFICANT CHANGE UP
B MICROTI IGM TITR SER: SIGNIFICANT CHANGE UP
BABESIA AB SER-ACNC: SIGNIFICANT CHANGE UP
CULTURE RESULTS: SIGNIFICANT CHANGE UP
CULTURE RESULTS: SIGNIFICANT CHANGE UP
SPECIMEN SOURCE: SIGNIFICANT CHANGE UP
SPECIMEN SOURCE: SIGNIFICANT CHANGE UP

## 2017-09-24 LAB
A PHAGOCYTOPH IGG TITR SER IF: SIGNIFICANT CHANGE UP
A PHAGOCYTOPH IGG TITR SER IF: SIGNIFICANT CHANGE UP
A PHAGOCYTOPH IGM TITR SER IF: SIGNIFICANT CHANGE UP
E CHAFFEENSIS IGG TITR SER: SIGNIFICANT CHANGE UP
E CHAFFEENSIS IGM TITR SER IF: SIGNIFICANT CHANGE UP
EHRLICHIA AB TITR SER: SIGNIFICANT CHANGE UP

## 2017-09-27 PROCEDURE — 86664 EPSTEIN-BARR NUCLEAR ANTIGEN: CPT

## 2017-09-27 PROCEDURE — 87633 RESP VIRUS 12-25 TARGETS: CPT

## 2017-09-27 PROCEDURE — 80076 HEPATIC FUNCTION PANEL: CPT

## 2017-09-27 PROCEDURE — 80053 COMPREHEN METABOLIC PANEL: CPT

## 2017-09-27 PROCEDURE — 36415 COLL VENOUS BLD VENIPUNCTURE: CPT

## 2017-09-27 PROCEDURE — 86703 HIV-1/HIV-2 1 RESULT ANTBDY: CPT

## 2017-09-27 PROCEDURE — 85027 COMPLETE CBC AUTOMATED: CPT

## 2017-09-27 PROCEDURE — 82550 ASSAY OF CK (CPK): CPT

## 2017-09-27 PROCEDURE — 86757 RICKETTSIA ANTIBODY: CPT

## 2017-09-27 PROCEDURE — 96374 THER/PROPH/DIAG INJ IV PUSH: CPT | Mod: XU

## 2017-09-27 PROCEDURE — 86618 LYME DISEASE ANTIBODY: CPT

## 2017-09-27 PROCEDURE — 87581 M.PNEUMON DNA AMP PROBE: CPT

## 2017-09-27 PROCEDURE — 74176 CT ABD & PELVIS W/O CONTRAST: CPT

## 2017-09-27 PROCEDURE — 87486 CHLMYD PNEUM DNA AMP PROBE: CPT

## 2017-09-27 PROCEDURE — 85379 FIBRIN DEGRADATION QUANT: CPT

## 2017-09-27 PROCEDURE — 87040 BLOOD CULTURE FOR BACTERIA: CPT

## 2017-09-27 PROCEDURE — 86666 EHRLICHIA ANTIBODY: CPT

## 2017-09-27 PROCEDURE — 86308 HETEROPHILE ANTIBODY SCREEN: CPT

## 2017-09-27 PROCEDURE — 86665 EPSTEIN-BARR CAPSID VCA: CPT

## 2017-09-27 PROCEDURE — 87207 SMEAR SPECIAL STAIN: CPT

## 2017-09-27 PROCEDURE — 80074 ACUTE HEPATITIS PANEL: CPT

## 2017-09-27 PROCEDURE — 86663 EPSTEIN-BARR ANTIBODY: CPT

## 2017-09-27 PROCEDURE — 87798 DETECT AGENT NOS DNA AMP: CPT

## 2017-09-27 PROCEDURE — 83605 ASSAY OF LACTIC ACID: CPT

## 2017-09-27 PROCEDURE — 99285 EMERGENCY DEPT VISIT HI MDM: CPT | Mod: 25

## 2017-09-27 PROCEDURE — 86753 PROTOZOA ANTIBODY NOS: CPT

## 2017-09-27 PROCEDURE — 71046 X-RAY EXAM CHEST 2 VIEWS: CPT

## 2017-09-27 PROCEDURE — 84484 ASSAY OF TROPONIN QUANT: CPT

## 2017-09-27 PROCEDURE — 81001 URINALYSIS AUTO W/SCOPE: CPT

## 2017-09-27 PROCEDURE — 93005 ELECTROCARDIOGRAM TRACING: CPT

## 2017-09-27 PROCEDURE — 80048 BASIC METABOLIC PNL TOTAL CA: CPT

## 2017-09-27 PROCEDURE — 82553 CREATINE MB FRACTION: CPT

## 2017-09-27 PROCEDURE — 71275 CT ANGIOGRAPHY CHEST: CPT

## 2017-09-27 PROCEDURE — 96375 TX/PRO/DX INJ NEW DRUG ADDON: CPT | Mod: XU

## 2017-09-27 PROCEDURE — 80307 DRUG TEST PRSMV CHEM ANLYZR: CPT

## 2017-09-27 PROCEDURE — 83615 LACTATE (LD) (LDH) ENZYME: CPT

## 2017-10-10 ENCOUNTER — APPOINTMENT (OUTPATIENT)
Dept: INTERNAL MEDICINE | Facility: CLINIC | Age: 27
End: 2017-10-10
Payer: COMMERCIAL

## 2017-10-10 PROCEDURE — 36415 COLL VENOUS BLD VENIPUNCTURE: CPT

## 2017-10-10 PROCEDURE — 99214 OFFICE O/P EST MOD 30 MIN: CPT | Mod: 25

## 2018-03-01 ENCOUNTER — TRANSCRIPTION ENCOUNTER (OUTPATIENT)
Age: 28
End: 2018-03-01

## 2018-04-13 ENCOUNTER — TRANSCRIPTION ENCOUNTER (OUTPATIENT)
Age: 28
End: 2018-04-13

## 2018-08-06 NOTE — H&P ADULT - NSHPSOURCEINFORD_GEN_ALL_CORE
Patient/Chart(s) O-T Plasty Text: The defect edges were debeveled with a #15 scalpel blade.  Given the location of the defect, shape of the defect and the proximity to free margins an O-T plasty was deemed most appropriate.  Using a sterile surgical marker, an appropriate O-T plasty was drawn incorporating the defect and placing the expected incisions within the relaxed skin tension lines where possible.    The area thus outlined was incised deep to adipose tissue with a #15 scalpel blade.  The skin margins were undermined to an appropriate distance in all directions utilizing iris scissors.

## 2019-01-12 ENCOUNTER — TRANSCRIPTION ENCOUNTER (OUTPATIENT)
Age: 29
End: 2019-01-12

## 2020-06-02 NOTE — ED ADULT NURSE NOTE - ATTEMPT TO OOB
no Duration Of Freeze Thaw-Cycle (Seconds): 0 Aperture Size (Optional): B Consent: The patient's consent was obtained including but not limited to risks of crusting, scabbing, blistering, scarring, darker or lighter pigmentary change, recurrence, incomplete removal and infection. Number Of Freeze-Thaw Cycles: 1 freeze-thaw cycle Render Post-Care Instructions In Note?: no Detail Level: Detailed Post-Care Instructions: I reviewed with the patient in detail post-care instructions. Patient is to wear sunprotection, and avoid picking at any of the treated lesions. Pt may apply Vaseline to crusted or scabbing areas.

## 2021-03-10 ENCOUNTER — TRANSCRIPTION ENCOUNTER (OUTPATIENT)
Age: 31
End: 2021-03-10

## 2021-10-13 ENCOUNTER — TRANSCRIPTION ENCOUNTER (OUTPATIENT)
Age: 31
End: 2021-10-13

## 2021-12-06 ENCOUNTER — TRANSCRIPTION ENCOUNTER (OUTPATIENT)
Age: 31
End: 2021-12-06

## 2022-03-28 ENCOUNTER — TRANSCRIPTION ENCOUNTER (OUTPATIENT)
Age: 32
End: 2022-03-28

## 2022-09-26 ENCOUNTER — NON-APPOINTMENT (OUTPATIENT)
Age: 32
End: 2022-09-26

## 2022-10-03 ENCOUNTER — NON-APPOINTMENT (OUTPATIENT)
Age: 32
End: 2022-10-03

## 2023-04-20 NOTE — DISCHARGE NOTE ADULT - CARE PLAN
I called and spoke with patients daughter, gustavo, about lung findings on imaging from 04/22. We talked about the f/u recommendation for the lung nodule. Patients daughter does not want to proceed with f/u imaging due to patients age and health status. Deactivated in [facility]  
Principal Discharge DX:	Acute febrile illness  Goal:	home  Instructions for follow-up, activity and diet:	regular diet

## 2024-12-23 ENCOUNTER — APPOINTMENT (OUTPATIENT)
Facility: CLINIC | Age: 34
End: 2024-12-23
Payer: OTHER MISCELLANEOUS

## 2024-12-23 VITALS — BODY MASS INDEX: 26.18 KG/M2 | WEIGHT: 187 LBS | HEIGHT: 71 IN

## 2024-12-23 DIAGNOSIS — S83.8X2A SPRAIN OF OTHER SPECIFIED PARTS OF LEFT KNEE, INITIAL ENCOUNTER: ICD-10-CM

## 2024-12-23 DIAGNOSIS — S83.92XS SPRAIN OF UNSPECIFIED SITE OF LEFT KNEE, SEQUELA: ICD-10-CM

## 2024-12-23 DIAGNOSIS — Z78.9 OTHER SPECIFIED HEALTH STATUS: ICD-10-CM

## 2024-12-23 PROCEDURE — 99203 OFFICE O/P NEW LOW 30 MIN: CPT | Mod: ACP

## 2024-12-23 PROCEDURE — 73564 X-RAY EXAM KNEE 4 OR MORE: CPT | Mod: LT

## 2025-01-17 ENCOUNTER — APPOINTMENT (OUTPATIENT)
Dept: MRI IMAGING | Facility: CLINIC | Age: 35
End: 2025-01-17
Payer: OTHER MISCELLANEOUS

## 2025-01-17 PROCEDURE — 73721 MRI JNT OF LWR EXTRE W/O DYE: CPT | Mod: LT

## 2025-01-22 ENCOUNTER — NON-APPOINTMENT (OUTPATIENT)
Age: 35
End: 2025-01-22

## 2025-02-24 ENCOUNTER — APPOINTMENT (OUTPATIENT)
Facility: CLINIC | Age: 35
End: 2025-02-24
Payer: OTHER MISCELLANEOUS

## 2025-02-24 VITALS — WEIGHT: 187 LBS | BODY MASS INDEX: 26.18 KG/M2 | HEIGHT: 71 IN

## 2025-02-24 DIAGNOSIS — S83.92XS SPRAIN OF UNSPECIFIED SITE OF LEFT KNEE, SEQUELA: ICD-10-CM

## 2025-02-24 DIAGNOSIS — S93.412D SPRAIN OF CALCANEOFIBULAR LIGAMENT OF LEFT ANKLE, SUBSEQUENT ENCOUNTER: ICD-10-CM

## 2025-02-24 PROCEDURE — 73610 X-RAY EXAM OF ANKLE: CPT | Mod: LT

## 2025-02-24 PROCEDURE — 99203 OFFICE O/P NEW LOW 30 MIN: CPT

## 2025-03-03 ENCOUNTER — NON-APPOINTMENT (OUTPATIENT)
Age: 35
End: 2025-03-03

## 2025-05-05 ENCOUNTER — APPOINTMENT (OUTPATIENT)
Facility: CLINIC | Age: 35
End: 2025-05-05

## 2025-06-17 ENCOUNTER — NON-APPOINTMENT (OUTPATIENT)
Age: 35
End: 2025-06-17

## 2025-07-02 ENCOUNTER — NON-APPOINTMENT (OUTPATIENT)
Age: 35
End: 2025-07-02

## 2025-09-20 ENCOUNTER — NON-APPOINTMENT (OUTPATIENT)
Age: 35
End: 2025-09-20